# Patient Record
Sex: FEMALE | URBAN - METROPOLITAN AREA
[De-identification: names, ages, dates, MRNs, and addresses within clinical notes are randomized per-mention and may not be internally consistent; named-entity substitution may affect disease eponyms.]

---

## 2019-10-28 ENCOUNTER — OFFICE VISIT (OUTPATIENT)
Dept: URBAN - METROPOLITAN AREA CLINIC 58 | Facility: CLINIC | Age: 59
End: 2019-10-28
Payer: OTHER GOVERNMENT

## 2019-10-28 DIAGNOSIS — H04.123 DRY EYE SYNDROME OF BILATERAL LACRIMAL GLANDS: ICD-10-CM

## 2019-10-28 DIAGNOSIS — H10.89 OTHER CONJUNCTIVITIS: Primary | ICD-10-CM

## 2019-10-28 PROCEDURE — 92004 COMPRE OPH EXAM NEW PT 1/>: CPT | Performed by: OPTOMETRIST

## 2019-10-28 ASSESSMENT — INTRAOCULAR PRESSURE
OD: 15
OS: 16

## 2019-10-28 NOTE — IMPRESSION/PLAN
Impression: Age-related nuclear cataract, bilateral: H25.13. Plan: Discussed diagnosis in detail with patient. No treatment is required at this time. Will continue to observe condition and or symptoms. Call if [address] worsens.

## 2019-10-28 NOTE — IMPRESSION/PLAN
Impression: Other conjunctivitis: H10.89. Plan: Diagnosis explained in detail, start [id number] [facility] . 1% 1gtt OU tid x 1 wk then bid x 1 wk then qd x 1 wk then d/c. Will continue to monitor the condition. Pt to call if condition worsens.

## 2019-11-25 ENCOUNTER — OFFICE VISIT (OUTPATIENT)
Dept: URBAN - METROPOLITAN AREA CLINIC 58 | Facility: CLINIC | Age: 59
End: 2019-11-25
Payer: OTHER GOVERNMENT

## 2019-11-25 PROCEDURE — 92012 INTRM OPH EXAM EST PATIENT: CPT | Performed by: OPTOMETRIST

## 2019-11-25 RX ORDER — FLUOROMETHOLONE 1 MG/ML
0.1 % SUSPENSION/ DROPS OPHTHALMIC
Qty: 1 | Refills: 0 | Status: INACTIVE
Start: 2019-11-25 | End: 2019-11-25

## 2019-11-25 ASSESSMENT — INTRAOCULAR PRESSURE
OD: 13
OS: 10

## 2019-11-25 NOTE — IMPRESSION/PLAN
Impression: Cysts of right upper eyelid: H02.821. Plan: Discussed diagnosis in detail with patient. FML drop should help shrink the cyst. Call if worse.

## 2019-11-25 NOTE — IMPRESSION/PLAN
Impression: Other conjunctivitis: H10.89. Plan: Diagnosis explained in detail, continue FML . 1% 1gtt OU BID x 2 weeks, qd until the bottle runs out. Will continue to monitor the condition. Continue using AT's. Pt to call if condition worsens.

## 2020-01-31 ENCOUNTER — OFFICE VISIT (OUTPATIENT)
Dept: URBAN - METROPOLITAN AREA CLINIC 58 | Facility: CLINIC | Age: 60
End: 2020-01-31
Payer: OTHER GOVERNMENT

## 2020-01-31 DIAGNOSIS — H11.442 CONJUNCTIVAL CYSTS, LEFT EYE: ICD-10-CM

## 2020-01-31 PROCEDURE — 99213 OFFICE O/P EST LOW 20 MIN: CPT | Performed by: OPTOMETRIST

## 2020-01-31 ASSESSMENT — INTRAOCULAR PRESSURE
OD: 14
OS: 14

## 2020-01-31 NOTE — IMPRESSION/PLAN
Impression: Dry eye syndrome of bilateral lacrimal glands: H04.123. Plan: Dry eyes account for the patient's complaints. There is no evidence of permanent changes to the cornea. Explained condition does not have a cure and will need artificial tears for maintenance. Continue using AT's. D/c FML. Consider puncta plugs. Call if worse.

## 2020-01-31 NOTE — IMPRESSION/PLAN
Impression: Conjunctival cysts, left eye: H11.442. Plan: Discussed diagnosis in detail with patient. Discussed the option of consulting with an Oculoplastic for further eval. Patient would like to continue to monitor for now.

## 2020-10-07 ENCOUNTER — OFFICE VISIT (OUTPATIENT)
Dept: URBAN - METROPOLITAN AREA CLINIC 58 | Facility: CLINIC | Age: 60
End: 2020-10-07
Payer: OTHER GOVERNMENT

## 2020-10-07 DIAGNOSIS — H02.821 CYSTS OF RIGHT UPPER EYELID: Primary | ICD-10-CM

## 2020-10-07 PROCEDURE — 92014 COMPRE OPH EXAM EST PT 1/>: CPT | Performed by: OPTOMETRIST

## 2020-10-07 RX ORDER — PREDNISOLONE ACETATE 10 MG/ML
1 % SUSPENSION/ DROPS OPHTHALMIC
Qty: 5 | Refills: 0 | Status: INACTIVE
Start: 2020-10-07 | End: 2021-02-11

## 2020-10-07 ASSESSMENT — KERATOMETRY
OD: 42.00
OS: 42.50

## 2020-10-07 ASSESSMENT — INTRAOCULAR PRESSURE
OS: 15
OD: 15

## 2020-10-07 ASSESSMENT — VISUAL ACUITY
OD: 20/20
OS: 20/20

## 2020-10-07 NOTE — IMPRESSION/PLAN
Impression: Cysts of right upper eyelid: H02.821. Plan: Discussed diagnosis in detail with patient. Discussed the option of consulting with an Oculoplastic for further eval or restarting steroid drops. Start Pred 1gtt OD TID x 1 wk, BID x 1 wk, QD x 1 wk, then d/c.

## 2020-10-07 NOTE — IMPRESSION/PLAN
Impression: Age-related nuclear cataract, bilateral: H25.13. Plan: Discussed diagnosis in detail with patient. No treatment is required at this time. Will continue to observe condition and or symptoms. Call if [address] worsens.

## 2020-11-06 ENCOUNTER — OFFICE VISIT (OUTPATIENT)
Dept: URBAN - METROPOLITAN AREA CLINIC 58 | Facility: CLINIC | Age: 60
End: 2020-11-06
Payer: OTHER GOVERNMENT

## 2020-11-06 DIAGNOSIS — H25.13 AGE-RELATED NUCLEAR CATARACT, BILATERAL: ICD-10-CM

## 2020-11-06 DIAGNOSIS — H11.441 CONJUNCTIVAL CYST OF RIGHT EYE: Primary | ICD-10-CM

## 2020-11-06 DIAGNOSIS — H11.31 CONJUNCTIVAL HEMORRHAGE, RIGHT EYE: ICD-10-CM

## 2020-11-06 PROCEDURE — 92012 INTRM OPH EXAM EST PATIENT: CPT | Performed by: OPTOMETRIST

## 2020-11-06 ASSESSMENT — INTRAOCULAR PRESSURE
OD: 13
OS: 13

## 2020-11-06 NOTE — IMPRESSION/PLAN
Impression: Conjunctival cyst of right eye: H11.441. Plan: Discussed diagnosis in detail with patient. Pred didn't seem to work, pt says it is getting bigger and irritating her. New subconj heme. Consult with oculoplastics.

## 2021-02-11 ENCOUNTER — OFFICE VISIT (OUTPATIENT)
Dept: URBAN - METROPOLITAN AREA CLINIC 62 | Facility: CLINIC | Age: 61
End: 2021-02-11
Payer: OTHER GOVERNMENT

## 2021-02-11 DIAGNOSIS — D48.1 NEOPLASM OF UNCERTAIN BEHAVIOR OF CONNECTIVE AND OTHER SOFT TISSUE: Primary | ICD-10-CM

## 2021-02-11 PROCEDURE — 99204 OFFICE O/P NEW MOD 45 MIN: CPT | Performed by: OPHTHALMOLOGY

## 2021-02-11 ASSESSMENT — INTRAOCULAR PRESSURE
OS: 14
OD: 14

## 2021-02-11 NOTE — IMPRESSION/PLAN
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 Plan: Discussed diagnosis in detail with patient. Discussed treatment options with patient. Recommend biopsy, obtain prior authorization, further conduct after biopsy results. RBA's discussed in detail with patient today. Patient understands cosmetically, there can be a scar after excision of lesion. Patient declines excision of lesion at this time. Recommended excision of lesion with biopsy of the Right Lower Conjunctiva. Patient to continue care with Dr. Brenton Garcia in [facility]. No further Oculoplastics intervention needed at this time. *Patient is currently being treated during COVID-19 epidemic, which limits our availability to establish proper follow up care. Patient understands these limitations, and is aware that we will continue treatment and follow up based on our availability, as determined by local state and federal guidelines.

## 2021-08-09 ENCOUNTER — OFFICE VISIT (OUTPATIENT)
Dept: URBAN - METROPOLITAN AREA CLINIC 58 | Facility: CLINIC | Age: 61
End: 2021-08-09
Payer: OTHER GOVERNMENT

## 2021-08-09 PROCEDURE — 99213 OFFICE O/P EST LOW 20 MIN: CPT | Performed by: OPHTHALMOLOGY

## 2021-08-09 ASSESSMENT — KERATOMETRY
OS: 42.75
OD: 42.38

## 2021-08-09 ASSESSMENT — INTRAOCULAR PRESSURE
OS: 18
OD: 18

## 2021-08-09 ASSESSMENT — VISUAL ACUITY
OS: 20/20
OD: 20/20

## 2021-08-09 NOTE — IMPRESSION/PLAN
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 Plan: Discussed diagnosis in detail with patient. Discussed treatment options with patient. Will consider an Oculoplastic consult. Patient would like to hold off.

## 2021-08-09 NOTE — IMPRESSION/PLAN
Impression: Dry eye syndrome of bilateral lacrimal glands: H04.123. Plan: Dry eyes account for the patient's complaints. There are no objective signs or evidence of permanent corneal damage. Suggested to switch to preservative free artificial tears for comfort.

## 2022-08-08 ENCOUNTER — OFFICE VISIT (OUTPATIENT)
Dept: URBAN - METROPOLITAN AREA CLINIC 58 | Facility: CLINIC | Age: 62
End: 2022-08-08
Payer: OTHER GOVERNMENT

## 2022-08-08 DIAGNOSIS — D48.1 NEOPLASM OF UNCERTAIN BEHAVIOR OF CONNECTIVE AND OTHER SOFT TISSUE: Primary | ICD-10-CM

## 2022-08-08 DIAGNOSIS — H25.13 AGE-RELATED NUCLEAR CATARACT, BILATERAL: ICD-10-CM

## 2022-08-08 PROCEDURE — 99213 OFFICE O/P EST LOW 20 MIN: CPT | Performed by: OPHTHALMOLOGY

## 2022-08-08 ASSESSMENT — VISUAL ACUITY
OD: 20/20
OS: 20/20

## 2022-08-08 ASSESSMENT — INTRAOCULAR PRESSURE
OS: 14
OD: 14

## 2022-08-08 NOTE — IMPRESSION/PLAN
Impression: Neoplasm of uncertain behavior of connective and other soft tissue: D48.1. Plan: Patient has noticed growth of the lesion and now has adjacent redness. Order consult with corneal / external disease specialist for further evaluation.